# Patient Record
Sex: FEMALE | URBAN - METROPOLITAN AREA
[De-identification: names, ages, dates, MRNs, and addresses within clinical notes are randomized per-mention and may not be internally consistent; named-entity substitution may affect disease eponyms.]

---

## 2023-06-04 ENCOUNTER — WEB ENCOUNTER (OUTPATIENT)
Dept: URBAN - METROPOLITAN AREA CLINIC 72 | Facility: CLINIC | Age: 28
End: 2023-06-04

## 2023-06-06 ENCOUNTER — OFFICE VISIT (OUTPATIENT)
Dept: URBAN - METROPOLITAN AREA CLINIC 72 | Facility: CLINIC | Age: 28
End: 2023-06-06
Payer: COMMERCIAL

## 2023-06-06 ENCOUNTER — DASHBOARD ENCOUNTERS (OUTPATIENT)
Age: 28
End: 2023-06-06

## 2023-06-06 ENCOUNTER — LAB OUTSIDE AN ENCOUNTER (OUTPATIENT)
Dept: URBAN - METROPOLITAN AREA CLINIC 72 | Facility: CLINIC | Age: 28
End: 2023-06-06

## 2023-06-06 VITALS
HEART RATE: 104 BPM | DIASTOLIC BLOOD PRESSURE: 81 MMHG | SYSTOLIC BLOOD PRESSURE: 134 MMHG | BODY MASS INDEX: 26.52 KG/M2 | TEMPERATURE: 97.6 F | HEIGHT: 66 IN | WEIGHT: 165 LBS

## 2023-06-06 DIAGNOSIS — R10.11 RIGHT UPPER QUADRANT PAIN: ICD-10-CM

## 2023-06-06 PROCEDURE — 99204 OFFICE O/P NEW MOD 45 MIN: CPT | Performed by: INTERNAL MEDICINE

## 2023-06-06 RX ORDER — DEXTROAMPHETAMINE SACCHARATE, AMPHETAMINE ASPARTATE MONOHYDRATE, DEXTROAMPHETAMINE SULFATE, AND AMPHETAMINE SULFATE 6.25; 6.25; 6.25; 6.25 MG/1; MG/1; MG/1; MG/1
TAKE ONE CAPSULE BY MOUTH EVERY MORNING CAPSULE, EXTENDED RELEASE ORAL
Qty: 30 UNSPECIFIED | Refills: 0 | Status: ACTIVE | COMMUNITY

## 2023-06-06 RX ORDER — PREDNISONE 20 MG/1
TAKE ONE TABLET BY MOUTH TWICE A DAY FOR 5 DAYS TABLET ORAL
Qty: 10 UNSPECIFIED | Refills: 0 | Status: ON HOLD | COMMUNITY

## 2023-06-06 RX ORDER — DROSPIRENONE AND ETHINYL ESTRADIOL TABLETS 0.02-3(28)
TAKE ONE TABLET BY MOUTH ONE TIME DAILY KIT ORAL
Qty: 84 UNSPECIFIED | Refills: 0 | Status: ACTIVE | COMMUNITY

## 2023-06-06 RX ORDER — FAMOTIDINE 20 MG/1
30 MINUTES BEFORE BREAKFAST ON AN EMPTY STOMACH TABLET, FILM COATED ORAL ONCE A DAY
Qty: 30 | Refills: 1 | OUTPATIENT
Start: 2023-06-06

## 2023-06-06 RX ORDER — DICYCLOMINE HYDROCHLORIDE 10 MG/1
1 CAPSULES CAPSULE ORAL THREE TIMES A DAY
Qty: 30 | Refills: 1 | OUTPATIENT
Start: 2023-06-06 | End: 2023-08-05

## 2023-06-06 RX ORDER — ALBUTEROL SULFATE 90 UG/1
INHALE 2 PUFFS AS NEED EVERY 4 HOURS AEROSOL, METERED RESPIRATORY (INHALATION)
Qty: 6.7 UNSPECIFIED | Refills: 0 | Status: ACTIVE | COMMUNITY

## 2023-06-06 NOTE — HPI-TODAY'S VISIT:
28-year-old female new to the clinic here for abdominal pain.  On interview today she has had 3 events of abdominal pain since mid-April. No pain currently.  Pain will be interimttent over 5 days.  Last one was over the weekend, pain epigastric area that radiates to her back.  She is training for a half-marathon and has been losing some weight.  She is 9 months post-partum.  She reports mid April she had some symptoms as week.  She reports some SOB with the episodes as well.  Feels feverish.  She went on a liquid diet and it helped.  She went to Urgent Care after the first event and then was prescribed 5 days of steriods for Viral URI.  She was negative for Covid, strep throat.  In 2020 she had umbilical pain and she had a CT in [place] where she lived at the time. Was offered referral to GI and Gyn and was elected to go to gyn told it was a gallstone.   She takes tylenol.  No NSAIDs.  No heartburn or dysphagia. No SOB currently or CP.  She denies any altered bowel movements, melena or hematochezia.  LMP: On it currently.  She is not breastfeeding.  Lab 4/19/2023.  Hemoglobin A1c 5.  CBC: Normal with Hgb 12.5.  TSH normal.  CMP normal.  Lipid panel: Triglycerides 171.

## 2023-06-06 NOTE — PHYSICAL EXAM GASTROINTESTINAL
soft, minimal epigastric tenderness, nondistended , normal bowel sounds, no guarding or rigidity, no rebound tenderness

## 2023-06-21 ENCOUNTER — TELEPHONE ENCOUNTER (OUTPATIENT)
Dept: URBAN - METROPOLITAN AREA CLINIC 72 | Facility: CLINIC | Age: 28
End: 2023-06-21

## 2023-06-21 ENCOUNTER — LAB OUTSIDE AN ENCOUNTER (OUTPATIENT)
Dept: URBAN - METROPOLITAN AREA CLINIC 72 | Facility: CLINIC | Age: 28
End: 2023-06-21

## 2023-06-21 RX ORDER — DEXTROAMPHETAMINE SACCHARATE, AMPHETAMINE ASPARTATE MONOHYDRATE, DEXTROAMPHETAMINE SULFATE, AND AMPHETAMINE SULFATE 6.25; 6.25; 6.25; 6.25 MG/1; MG/1; MG/1; MG/1
TAKE ONE CAPSULE BY MOUTH EVERY MORNING CAPSULE, EXTENDED RELEASE ORAL
Qty: 30 UNSPECIFIED | Refills: 0 | Status: ACTIVE | COMMUNITY

## 2023-06-21 RX ORDER — DICYCLOMINE HYDROCHLORIDE 10 MG/1
1 CAPSULES CAPSULE ORAL THREE TIMES A DAY
Qty: 30 | Refills: 1 | Status: ACTIVE | COMMUNITY
Start: 2023-06-06 | End: 2023-08-05

## 2023-06-21 RX ORDER — FAMOTIDINE 20 MG/1
30 MINUTES BEFORE BREAKFAST ON AN EMPTY STOMACH TABLET, FILM COATED ORAL ONCE A DAY
Qty: 30 | Refills: 1 | Status: ACTIVE | COMMUNITY
Start: 2023-06-06

## 2023-06-21 RX ORDER — PREDNISONE 20 MG/1
TAKE ONE TABLET BY MOUTH TWICE A DAY FOR 5 DAYS TABLET ORAL
Qty: 10 UNSPECIFIED | Refills: 0 | Status: ON HOLD | COMMUNITY

## 2023-06-21 RX ORDER — ALBUTEROL SULFATE 90 UG/1
INHALE 2 PUFFS AS NEED EVERY 4 HOURS AEROSOL, METERED RESPIRATORY (INHALATION)
Qty: 6.7 UNSPECIFIED | Refills: 0 | Status: ACTIVE | COMMUNITY

## 2023-06-21 RX ORDER — OMEPRAZOLE 40 MG/1
1 CAPSULE 30 MINUTES BEFORE MORNING MEAL CAPSULE, DELAYED RELEASE ORAL ONCE A DAY
Qty: 30 | Refills: 1 | OUTPATIENT
Start: 2023-06-21

## 2023-06-21 RX ORDER — DROSPIRENONE AND ETHINYL ESTRADIOL TABLETS 0.02-3(28)
TAKE ONE TABLET BY MOUTH ONE TIME DAILY KIT ORAL
Qty: 84 UNSPECIFIED | Refills: 0 | Status: ACTIVE | COMMUNITY